# Patient Record
Sex: FEMALE | Race: WHITE | URBAN - METROPOLITAN AREA
[De-identification: names, ages, dates, MRNs, and addresses within clinical notes are randomized per-mention and may not be internally consistent; named-entity substitution may affect disease eponyms.]

---

## 2022-07-01 ENCOUNTER — TELEPHONE (OUTPATIENT)
Dept: INTERNAL MEDICINE CLINIC | Age: 37
End: 2022-07-01

## 2022-07-01 NOTE — TELEPHONE ENCOUNTER
----- Message from Renato Yang sent at 7/1/2022  1:18 PM EDT -----  Subject: Message to Provider    QUESTIONS  Information for Provider? Pt is requesting to schedule with Dr. Tsering Dawkins. She is new to the area and is not an established pt. I informed her she   may need to be established first. I was unable to get in touch with the   office for confirmation. Please contact pt for scheduling or further   discussion.   ---------------------------------------------------------------------------  --------------  [address]  [phone];  Do not leave any message, patient will call back for answer  ---------------------------------------------------------------------------  --------------  SCRIPT ANSWERS  undefined

## 2022-07-01 NOTE — TELEPHONE ENCOUNTER
Attempted to contact pt, per call back info below no VM was left. If pt returns call please let her know she will need to establish with a PCP within our office then they will refer her. As of right now Dr. Maykel Silva is booked until the end of October, it pt wants to be seen sooner she can contact her insurance company and they will let her know who is in network.

## 2023-11-06 ENCOUNTER — OFFICE VISIT (OUTPATIENT)
Age: 38
End: 2023-11-06

## 2023-11-06 VITALS
OXYGEN SATURATION: 95 % | SYSTOLIC BLOOD PRESSURE: 123 MMHG | HEIGHT: 63 IN | HEART RATE: 119 BPM | WEIGHT: 175.7 LBS | DIASTOLIC BLOOD PRESSURE: 85 MMHG | TEMPERATURE: 98 F | BODY MASS INDEX: 31.13 KG/M2

## 2023-11-06 DIAGNOSIS — J45.901 ASTHMA WITH ACUTE EXACERBATION, UNSPECIFIED ASTHMA SEVERITY, UNSPECIFIED WHETHER PERSISTENT: Primary | ICD-10-CM

## 2023-11-06 PROBLEM — F41.1 GENERALIZED ANXIETY DISORDER: Status: ACTIVE | Noted: 2022-09-07

## 2023-11-06 PROBLEM — G47.10 HYPERSOMNIA: Status: ACTIVE | Noted: 2022-07-18

## 2023-11-06 PROBLEM — M54.10 RADICULOPATHY AFFECTING UPPER EXTREMITY: Status: ACTIVE | Noted: 2022-09-07

## 2023-11-06 RX ORDER — PREDNISONE 10 MG/1
TABLET ORAL
Qty: 21 TABLET | Refills: 0 | Status: SHIPPED | OUTPATIENT
Start: 2023-11-06

## 2023-11-06 RX ORDER — ALBUTEROL SULFATE 90 UG/1
2 AEROSOL, METERED RESPIRATORY (INHALATION) EVERY 6 HOURS PRN
Qty: 18 G | Refills: 1 | Status: SHIPPED | OUTPATIENT
Start: 2023-11-06

## 2023-11-06 RX ORDER — DIAZEPAM 5 MG/1
5 TABLET ORAL 2 TIMES DAILY
COMMUNITY

## 2023-11-06 RX ORDER — BUPROPION HYDROCHLORIDE 150 MG/1
150 TABLET ORAL
COMMUNITY
Start: 2023-03-14

## 2023-11-06 RX ORDER — FLUVOXAMINE MALEATE 100 MG
150 TABLET ORAL DAILY
COMMUNITY

## 2023-11-06 RX ORDER — FLUTICASONE PROPIONATE 250 UG/1
1 POWDER, METERED RESPIRATORY (INHALATION) 2 TIMES DAILY
Qty: 60 EACH | Refills: 0 | Status: SHIPPED | OUTPATIENT
Start: 2023-11-06 | End: 2023-12-06

## 2023-11-06 RX ORDER — FLUVOXAMINE MALEATE 50 MG/1
100 TABLET, COATED ORAL NIGHTLY
COMMUNITY

## 2023-11-06 RX ORDER — PROPRANOLOL HYDROCHLORIDE 10 MG/1
10 TABLET ORAL PRN
COMMUNITY

## 2023-11-06 RX ORDER — TRAZODONE HYDROCHLORIDE 100 MG/1
100 TABLET ORAL NIGHTLY
COMMUNITY

## 2023-11-06 NOTE — PATIENT INSTRUCTIONS
- Steroids will cause your glucose (blood sugar) levels to rise. If you are diabetic, please monitor your glucose levels carefully. Watch your food intake and take caution with foods high in sugar and carbohydrates as weight gain is another side effect of steroid use. Elevated heart rate is another common finding with steroids. Take this medication with food as it can irritate your stomach to the same degree that ibuprofen would. Deidre,    It was an absolute pleasure taking care of you today. I'm hoping you'll start feeling better as soon as possible. Please call me if you have any questions or concerns about what we talked about today. Feel free stop back in if anything changes or things seem to be getting worse. If for some reason you develop any serious or potentially life-threatening issue, call 911 or proceed to the nearest emergency department. Hopefully it will never come to that. Otherwise, it was very nice to meet you Deidre.       Thanks,     Mimi aVlderrama

## 2024-08-19 ENCOUNTER — OFFICE VISIT (OUTPATIENT)
Age: 39
End: 2024-08-19

## 2024-08-19 VITALS
SYSTOLIC BLOOD PRESSURE: 110 MMHG | WEIGHT: 158 LBS | HEIGHT: 63 IN | OXYGEN SATURATION: 97 % | HEART RATE: 106 BPM | BODY MASS INDEX: 28 KG/M2 | TEMPERATURE: 98.2 F | DIASTOLIC BLOOD PRESSURE: 68 MMHG

## 2024-08-19 DIAGNOSIS — R21 RASH AND NONSPECIFIC SKIN ERUPTION: Primary | ICD-10-CM

## 2024-08-19 RX ORDER — NORETHINDRONE AND ETHINYL ESTRADIOL 1 MG-35MCG
KIT ORAL
COMMUNITY
Start: 2024-07-29

## 2024-08-19 RX ORDER — METHYLPREDNISOLONE 4 MG/1
TABLET ORAL
Qty: 1 KIT | Refills: 0 | Status: SHIPPED | OUTPATIENT
Start: 2024-08-19

## 2024-09-26 ENCOUNTER — OFFICE VISIT (OUTPATIENT)
Age: 39
End: 2024-09-26

## 2024-09-26 VITALS
BODY MASS INDEX: 29.54 KG/M2 | OXYGEN SATURATION: 97 % | HEIGHT: 63 IN | HEART RATE: 96 BPM | TEMPERATURE: 97.8 F | RESPIRATION RATE: 22 BRPM | DIASTOLIC BLOOD PRESSURE: 69 MMHG | WEIGHT: 166.7 LBS | SYSTOLIC BLOOD PRESSURE: 129 MMHG

## 2024-09-26 DIAGNOSIS — J45.901 ASTHMA EXACERBATION, MILD: Primary | ICD-10-CM

## 2024-09-26 DIAGNOSIS — H66.001 NON-RECURRENT ACUTE SUPPURATIVE OTITIS MEDIA OF RIGHT EAR WITHOUT SPONTANEOUS RUPTURE OF TYMPANIC MEMBRANE: ICD-10-CM

## 2024-09-26 DIAGNOSIS — R05.1 ACUTE COUGH: ICD-10-CM

## 2024-09-26 DIAGNOSIS — R11.11 VOMITING WITHOUT NAUSEA, UNSPECIFIED VOMITING TYPE: ICD-10-CM

## 2024-09-26 DIAGNOSIS — R06.2 WHEEZING: ICD-10-CM

## 2024-09-26 RX ORDER — METHYLPREDNISOLONE 4 MG
TABLET, DOSE PACK ORAL
Qty: 21 TABLET | Refills: 0 | Status: SHIPPED | OUTPATIENT
Start: 2024-09-26 | End: 2024-10-02

## 2024-09-26 RX ORDER — ONDANSETRON 4 MG/1
4 TABLET, ORALLY DISINTEGRATING ORAL ONCE
Status: COMPLETED | OUTPATIENT
Start: 2024-09-26 | End: 2024-09-26

## 2024-09-26 RX ORDER — LEVOTHYROXINE SODIUM 25 UG/1
25 TABLET ORAL DAILY
COMMUNITY
Start: 2024-04-08 | End: 2025-04-08

## 2024-09-26 RX ORDER — IPRATROPIUM BROMIDE AND ALBUTEROL SULFATE 2.5; .5 MG/3ML; MG/3ML
1 SOLUTION RESPIRATORY (INHALATION) ONCE
Status: COMPLETED | OUTPATIENT
Start: 2024-09-26 | End: 2024-09-26

## 2024-09-26 RX ORDER — AMOXICILLIN 500 MG/1
500 CAPSULE ORAL 2 TIMES DAILY
Qty: 14 CAPSULE | Refills: 0 | Status: SHIPPED | OUTPATIENT
Start: 2024-09-26 | End: 2024-10-03

## 2024-09-26 RX ADMIN — IPRATROPIUM BROMIDE AND ALBUTEROL SULFATE 1 DOSE: 2.5; .5 SOLUTION RESPIRATORY (INHALATION) at 11:34

## 2024-09-26 RX ADMIN — ONDANSETRON 4 MG: 4 TABLET, ORALLY DISINTEGRATING ORAL at 12:04

## 2024-09-26 ASSESSMENT — ENCOUNTER SYMPTOMS
SORE THROAT: 0
WHEEZING: 1
SHORTNESS OF BREATH: 1
SINUS PRESSURE: 1
COUGH: 1

## 2025-05-13 ENCOUNTER — OFFICE VISIT (OUTPATIENT)
Age: 40
End: 2025-05-13

## 2025-05-13 VITALS
TEMPERATURE: 98 F | RESPIRATION RATE: 24 BRPM | WEIGHT: 168.4 LBS | SYSTOLIC BLOOD PRESSURE: 111 MMHG | HEIGHT: 63 IN | DIASTOLIC BLOOD PRESSURE: 64 MMHG | OXYGEN SATURATION: 96 % | HEART RATE: 120 BPM | BODY MASS INDEX: 29.84 KG/M2

## 2025-05-13 DIAGNOSIS — J40 BRONCHITIS: ICD-10-CM

## 2025-05-13 DIAGNOSIS — J45.21 MILD INTERMITTENT ASTHMA WITH EXACERBATION: Primary | ICD-10-CM

## 2025-05-13 RX ORDER — BUSPIRONE HYDROCHLORIDE 10 MG/1
TABLET ORAL
COMMUNITY
Start: 2024-12-10

## 2025-05-13 RX ORDER — PREDNISONE 20 MG/1
20 TABLET ORAL 2 TIMES DAILY
Qty: 10 TABLET | Refills: 0 | Status: SHIPPED | OUTPATIENT
Start: 2025-05-13 | End: 2025-05-13

## 2025-05-13 RX ORDER — AZITHROMYCIN 250 MG/1
TABLET, FILM COATED ORAL
Qty: 6 TABLET | Refills: 0 | Status: SHIPPED | OUTPATIENT
Start: 2025-05-13 | End: 2025-05-23

## 2025-05-13 RX ORDER — PREDNISONE 20 MG/1
20 TABLET ORAL 2 TIMES DAILY
Qty: 10 TABLET | Refills: 0 | Status: SHIPPED | OUTPATIENT
Start: 2025-05-13 | End: 2025-05-18

## 2025-05-13 RX ORDER — AZITHROMYCIN 250 MG/1
TABLET, FILM COATED ORAL
Qty: 6 TABLET | Refills: 0 | Status: SHIPPED | OUTPATIENT
Start: 2025-05-13 | End: 2025-05-13

## 2025-05-13 RX ORDER — CHORIOGONADOTROPIN ALFA 250 UG/.5ML
INJECTION, SOLUTION SUBCUTANEOUS
COMMUNITY
Start: 2025-02-05

## 2025-05-13 RX ORDER — IPRATROPIUM BROMIDE AND ALBUTEROL SULFATE 2.5; .5 MG/3ML; MG/3ML
1 SOLUTION RESPIRATORY (INHALATION) ONCE
Status: COMPLETED | OUTPATIENT
Start: 2025-05-13 | End: 2025-05-13

## 2025-05-13 RX ADMIN — IPRATROPIUM BROMIDE AND ALBUTEROL SULFATE 1 DOSE: 2.5; .5 SOLUTION RESPIRATORY (INHALATION) at 18:53

## 2025-05-13 NOTE — PROGRESS NOTES
Deidre Liu (:  1985) is a 40 y.o. female,Established patient, here for evaluation of the following chief complaint(s):  Cough (C/o cough, wheezing, lightheaded,  and asthma flare up X 3 days )    Pt presents with persistent cough, congestion, and wheezing, consistent with acute bronchitis with reactive airway involvement.  Due to duration of symptoms, rapid onset, and concern for possible bacterial infection, prescribed Zithromax.  Prednisone initiated for airway inflammation, continue Albuterol (patient has at home) for bronchospasm, and Bromfed for symptomatic relief. Patient stable for outpatient management.      ASSESSMENT/PLAN:  1. Mild intermittent asthma with exacerbation    - ipratropium 0.5 mg-albuterol 2.5 mg (DUONEB) nebulizer solution 1 Dose    -Patient responded well to nebulizer treatment.  Her HR decreased, wheezing decreased, oxygen saturation increased to 96%    - predniSONE (DELTASONE) 20 MG tablet; Take 1 tablet by mouth 2 times daily for 5 days  Dispense: 10 tablet; Refill: 0    albuterol sulfate HFA (PROVENTIL;VENTOLIN;PROAIR) 108 (90 Base) MCG/ACT inhaler      Boogie Martinez APRN - CNP         Summary: Inhale 2 puffs into the lungs every 6 hours as needed for Wheezing, Disp-18 g, R-1 Normal        2. Bronchitis    - azithromycin (ZITHROMAX) 250 MG tablet; 500mg on day 1 followed by 250mg on days 2 - 5  Dispense: 6 tablet; Refill: 0       Return if symptoms worsen or fail to improve.    SUBJECTIVE/OBJECTIVE:  40-year-old female with history of asthma presents to clinic with complaints of wheezing, sob, cough, and lightheadedness worsening over the last 3 days.  She has been using her albuterol inhaler as needed but it is not effective today.      History provided by:  Patient      Vitals:    25 1829 25 1857 25 1914   BP: 111/64     BP Site: Left Upper Arm     Patient Position: Sitting     BP Cuff Size: Large Adult     Pulse: (!) 117 (!) 130 (!) 120   Resp:   24

## 2025-05-13 NOTE — PATIENT INSTRUCTIONS
Follow up with your primary care provider as discussed.    Take medication as prescribed.      Take a medicine like acetaminophen (sample brand name: Tylenol) or ibuprofen (sample brand names: Advil, Motrin) to help bring down your fever or for discomfort.    Go to the nearest emergency room for symptoms including, but not limited to, shortness of breath, chest pain, mental status change, fevers >101, difficulty or inability to swallow, dehydration, or if symptoms worsen.

## 2025-05-14 ASSESSMENT — ENCOUNTER SYMPTOMS
TROUBLE SWALLOWING: 0
FACIAL SWELLING: 0
NAUSEA: 0
EYES NEGATIVE: 1
EYE PAIN: 0
COUGH: 1
ABDOMINAL PAIN: 0
WHEEZING: 1
VOICE CHANGE: 0
VOMITING: 0
CHEST TIGHTNESS: 1
RHINORRHEA: 0
DIARRHEA: 0
SINUS PAIN: 0
SORE THROAT: 0
GASTROINTESTINAL NEGATIVE: 1
SHORTNESS OF BREATH: 1

## 2025-06-09 ENCOUNTER — OFFICE VISIT (OUTPATIENT)
Dept: PRIMARY CARE CLINIC | Age: 40
End: 2025-06-09
Payer: COMMERCIAL

## 2025-06-09 VITALS
BODY MASS INDEX: 29.23 KG/M2 | DIASTOLIC BLOOD PRESSURE: 90 MMHG | WEIGHT: 165 LBS | SYSTOLIC BLOOD PRESSURE: 130 MMHG | OXYGEN SATURATION: 99 % | HEART RATE: 88 BPM | HEIGHT: 63 IN

## 2025-06-09 DIAGNOSIS — G47.419 PRIMARY NARCOLEPSY WITHOUT CATAPLEXY: ICD-10-CM

## 2025-06-09 DIAGNOSIS — I99.9 VASCULAR ABNORMALITY: ICD-10-CM

## 2025-06-09 DIAGNOSIS — R00.0 SINUS TACHYCARDIA: ICD-10-CM

## 2025-06-09 DIAGNOSIS — J30.2 SEASONAL ALLERGIES: ICD-10-CM

## 2025-06-09 DIAGNOSIS — M25.511 CHRONIC RIGHT SHOULDER PAIN: ICD-10-CM

## 2025-06-09 DIAGNOSIS — G89.29 CHRONIC RIGHT SHOULDER PAIN: ICD-10-CM

## 2025-06-09 DIAGNOSIS — F33.42 RECURRENT MAJOR DEPRESSIVE DISORDER, IN FULL REMISSION: ICD-10-CM

## 2025-06-09 DIAGNOSIS — J45.50 SEVERE PERSISTENT ASTHMA WITHOUT COMPLICATION (HCC): Primary | ICD-10-CM

## 2025-06-09 DIAGNOSIS — R56.9 SEIZURES (HCC): ICD-10-CM

## 2025-06-09 DIAGNOSIS — F41.1 GENERALIZED ANXIETY DISORDER: ICD-10-CM

## 2025-06-09 DIAGNOSIS — E03.8 SUBCLINICAL HYPOTHYROIDISM: ICD-10-CM

## 2025-06-09 PROCEDURE — 99204 OFFICE O/P NEW MOD 45 MIN: CPT | Performed by: FAMILY MEDICINE

## 2025-06-09 RX ORDER — ALBUTEROL SULFATE 90 UG/1
2 INHALANT RESPIRATORY (INHALATION) EVERY 6 HOURS PRN
Qty: 18 G | Refills: 1 | Status: SHIPPED | OUTPATIENT
Start: 2025-06-09

## 2025-06-09 RX ORDER — FLUTICASONE PROPIONATE AND SALMETEROL XINAFOATE 230; 21 UG/1; UG/1
2 AEROSOL, METERED RESPIRATORY (INHALATION) 2 TIMES DAILY
Qty: 12 G | Refills: 3 | Status: SHIPPED | OUTPATIENT
Start: 2025-06-09

## 2025-06-09 RX ORDER — PREDNISONE 20 MG/1
20 TABLET ORAL DAILY
COMMUNITY

## 2025-06-09 SDOH — ECONOMIC STABILITY: FOOD INSECURITY: WITHIN THE PAST 12 MONTHS, YOU WORRIED THAT YOUR FOOD WOULD RUN OUT BEFORE YOU GOT MONEY TO BUY MORE.: NEVER TRUE

## 2025-06-09 SDOH — HEALTH STABILITY: PHYSICAL HEALTH: ON AVERAGE, HOW MANY DAYS PER WEEK DO YOU ENGAGE IN MODERATE TO STRENUOUS EXERCISE (LIKE A BRISK WALK)?: 2 DAYS

## 2025-06-09 SDOH — HEALTH STABILITY: PHYSICAL HEALTH: ON AVERAGE, HOW MANY MINUTES DO YOU ENGAGE IN EXERCISE AT THIS LEVEL?: 30 MIN

## 2025-06-09 SDOH — ECONOMIC STABILITY: FOOD INSECURITY: WITHIN THE PAST 12 MONTHS, THE FOOD YOU BOUGHT JUST DIDN'T LAST AND YOU DIDN'T HAVE MONEY TO GET MORE.: NEVER TRUE

## 2025-06-09 ASSESSMENT — PATIENT HEALTH QUESTIONNAIRE - PHQ9
1. LITTLE INTEREST OR PLEASURE IN DOING THINGS: NOT AT ALL
2. FEELING DOWN, DEPRESSED OR HOPELESS: NOT AT ALL
SUM OF ALL RESPONSES TO PHQ QUESTIONS 1-9: 0

## 2025-06-09 ASSESSMENT — ENCOUNTER SYMPTOMS
EYE PAIN: 0
COUGH: 0
SORE THROAT: 0
SHORTNESS OF BREATH: 0
ABDOMINAL PAIN: 0

## 2025-06-09 NOTE — PROGRESS NOTES
pulmonology for further evaluation and management of severe symptoms  - [facility] - Rob Garcia MD, Allergy & Immunology, [facility]  - Alverto Reis MD, Pulmonary, [facility]    2. Seasonal allergies  - [facility] - Rob Garcia MD, Allergy & Immunology, [facility]    3. Seizures (HCC)  - witnessed seizure in [place], had normal CT head then but no other work-up  - check MRI brain  - referred to neurology  - MRI BRAIN WO CONTRAST; Future  - Martin Spence MD, Neurology, [facility]    4. Sinus tachycardia  - previously with normal ECHO  - referred to cardiology  - Trish Jiang DO, Cardiology, [facility]    5. Vascular abnormality of right external jugular  - Audie Bell DO, Vascular/Endovascular Surgery, [facility]    6. Subclinical hypothyroidism  - controlled on Synthroid 25 mcg    7. Chronic right shoulder pain  - previously was seeing ortho and PT, requesting referral to continue with PT  - [facility] - [facility]    8. Recurrent major depressive disorder, in full remission  - controlled, following with psychiatry  - cont SSRI    9. Generalized anxiety disorder  - stable, following with psychiatry    10. Primary narcolepsy without cataplexy  - stable off medications at this time       No follow-ups on file.    Electronically signed by Albertina Reid MD on 6/9/2025 at 10:56 AM

## 2025-06-11 ENCOUNTER — RESULTS FOLLOW-UP (OUTPATIENT)
Dept: NEUROLOGY | Age: 40
End: 2025-06-11

## 2025-06-12 ENCOUNTER — OFFICE VISIT (OUTPATIENT)
Dept: NEUROLOGY | Age: 40
End: 2025-06-12
Payer: COMMERCIAL

## 2025-06-12 VITALS
DIASTOLIC BLOOD PRESSURE: 102 MMHG | BODY MASS INDEX: 28.99 KG/M2 | WEIGHT: 163.6 LBS | HEIGHT: 63 IN | HEART RATE: 115 BPM | SYSTOLIC BLOOD PRESSURE: 135 MMHG

## 2025-06-12 DIAGNOSIS — R56.9 NEW ONSET SEIZURE (HCC): Primary | ICD-10-CM

## 2025-06-12 DIAGNOSIS — G47.419 PRIMARY NARCOLEPSY WITHOUT CATAPLEXY: ICD-10-CM

## 2025-06-12 DIAGNOSIS — F41.1 GENERALIZED ANXIETY DISORDER: ICD-10-CM

## 2025-06-12 PROCEDURE — 99204 OFFICE O/P NEW MOD 45 MIN: CPT | Performed by: PSYCHIATRY & NEUROLOGY

## 2025-06-12 NOTE — PROGRESS NOTES
treatment options as well as adherence to medication regimen and side effect from these medications.        Assessment:     Diagnosis Orders   1. New onset seizure (HCC)  EEG      2. Primary narcolepsy without cataplexy        3. Generalized anxiety disorder            New onset seizure. So far no risk for epilepsy, no specific cause  Narcolepsy  SELIN  Bronchial asthma    Plan:  Schedule 1 hour EEG  No need to start AED unless seizures recur  No driving for 3 months  Seizure precaution, risk of falling, risk of recurrence and safety discussed in detail with the patient  Continue respiratory support  Continue levofloxacin  Follow-up with me if symptoms recur or if EEG is abnormal.

## 2025-06-13 ENCOUNTER — TELEPHONE (OUTPATIENT)
Dept: VASCULAR SURGERY | Age: 40
End: 2025-06-13

## 2025-06-13 NOTE — TELEPHONE ENCOUNTER
I left a message on the patient voicemail, to offer patient an appointment with Dr. Sinclair for an eval. We received a referral from Dr. Reid for a treat and eval for patient.    Thanks,  Estrada

## 2025-06-19 ENCOUNTER — PROCEDURE VISIT (OUTPATIENT)
Dept: NEUROLOGY | Age: 40
End: 2025-06-19
Payer: COMMERCIAL

## 2025-06-19 DIAGNOSIS — R56.9 NEW ONSET SEIZURE (HCC): ICD-10-CM

## 2025-06-19 PROCEDURE — 95812 EEG 41-60 MINUTES: CPT | Performed by: PSYCHIATRY & NEUROLOGY

## 2025-06-19 NOTE — PATIENT INSTRUCTIONS
Verbal consent was obtained from patient and/or patient's advocate for in office procedure with Dr. Martin Land MD (EMG or EEG).

## 2025-06-24 ENCOUNTER — OFFICE VISIT (OUTPATIENT)
Dept: PULMONOLOGY | Age: 40
End: 2025-06-24
Payer: COMMERCIAL

## 2025-06-24 VITALS
SYSTOLIC BLOOD PRESSURE: 132 MMHG | DIASTOLIC BLOOD PRESSURE: 78 MMHG | HEART RATE: 102 BPM | OXYGEN SATURATION: 100 % | WEIGHT: 164.6 LBS | BODY MASS INDEX: 29.16 KG/M2 | HEIGHT: 63 IN

## 2025-06-24 DIAGNOSIS — J45.50 SEVERE PERSISTENT ASTHMA WITHOUT COMPLICATION (HCC): Primary | ICD-10-CM

## 2025-06-24 PROCEDURE — 99204 OFFICE O/P NEW MOD 45 MIN: CPT | Performed by: INTERNAL MEDICINE

## 2025-06-24 RX ORDER — MONTELUKAST SODIUM 10 MG/1
10 TABLET ORAL NIGHTLY
Qty: 30 TABLET | Refills: 3 | Status: SHIPPED | OUTPATIENT
Start: 2025-06-24

## 2025-06-24 RX ORDER — BUDESONIDE, GLYCOPYRROLATE, AND FORMOTEROL FUMARATE 160; 9; 4.8 UG/1; UG/1; UG/1
2 AEROSOL, METERED RESPIRATORY (INHALATION) 2 TIMES DAILY
Qty: 1 EACH | Refills: 5 | Status: SHIPPED | OUTPATIENT
Start: 2025-06-24

## 2025-06-24 RX ORDER — PREDNISONE 10 MG/1
10 TABLET ORAL DAILY
Qty: 30 TABLET | Refills: 11 | Status: SHIPPED | OUTPATIENT
Start: 2025-06-24 | End: 2026-06-19

## 2025-06-24 ASSESSMENT — ENCOUNTER SYMPTOMS
CONSTIPATION: 0
NAUSEA: 0
ABDOMINAL PAIN: 0
DIARRHEA: 0
SINUS PRESSURE: 0

## 2025-06-24 NOTE — PROGRESS NOTES
tenderness.   Abdominal:      General: Bowel sounds are normal. There is no distension.      Palpations: Abdomen is soft. Mass: There is no hepatosplenomegaly.      Tenderness: There is no abdominal tenderness.   Musculoskeletal:         General: No tenderness (Muscle strength is normal and there is no obvious atrophy).      Cervical back: Neck supple.   Lymphadenopathy:      Cervical: No cervical adenopathy.   Skin:     General: Skin is warm and dry.      Findings: No rash.   Psychiatric:      Comments: Oriented to person place and time         No Known Allergies  Prior to Visit Medications    Medication Sig Taking? Authorizing Provider   Budeson-Glycopyrrol-Formoterol (BREZTRI AEROSPHERE) 160-9-4.8 MCG/ACT AERO Inhale 2 puffs into the lungs 2 times daily Yes Alfred Elizabeth MD   montelukast (SINGULAIR) 10 MG tablet Take 1 tablet by mouth nightly Yes Alfred Elizabeth MD   predniSONE (DELTASONE) 10 MG tablet Take 1 tablet by mouth daily Yes Alfred Elizabeth MD   predniSONE (DELTASONE) 20 MG tablet Take 1 tablet by mouth daily  Patient taking differently: Take 0.5 tablets by mouth daily Yes Michael Grissom MD   fluticasone-salmeterol (ADVAIR HFA) 230-21 MCG/ACT inhaler Inhale 2 puffs into the lungs 2 times daily Yes Albertina Reid MD   albuterol sulfate HFA (PROVENTIL;VENTOLIN;PROAIR) 108 (90 Base) MCG/ACT inhaler Inhale 2 puffs into the lungs every 6 hours as needed for Wheezing Yes Albertina Reid MD   busPIRone (BUSPAR) 10 MG tablet  Yes Michael Grissom MD   diazePAM (VALIUM) 5 MG tablet Take 1 tablet by mouth 2 times daily. Yes Michael Grissom MD   fluvoxaMINE (LUVOX) 100 MG tablet Take 1.5 tablets by mouth daily Yes Michael Grissom MD   traZODone (DESYREL) 100 MG tablet Take 1 tablet by mouth nightly Yes Michael Grissom MD   levothyroxine (SYNTHROID) 25 MCG tablet Take 1 tablet by mouth daily  Michael Grissom MD       Vitals:    06/24/25 1606   BP: 132/78   BP Site:

## 2025-06-25 ENCOUNTER — HOSPITAL ENCOUNTER (OUTPATIENT)
Dept: PHYSICAL THERAPY | Age: 40
Setting detail: THERAPIES SERIES
Discharge: HOME | End: 2025-06-25
Payer: COMMERCIAL

## 2025-06-25 ENCOUNTER — OFFICE VISIT (OUTPATIENT)
Dept: VASCULAR SURGERY | Age: 40
End: 2025-06-25
Payer: COMMERCIAL

## 2025-06-25 VITALS
OXYGEN SATURATION: 9 % | HEART RATE: 133 BPM | DIASTOLIC BLOOD PRESSURE: 90 MMHG | WEIGHT: 165 LBS | SYSTOLIC BLOOD PRESSURE: 140 MMHG | BODY MASS INDEX: 29.23 KG/M2 | HEIGHT: 63 IN

## 2025-06-25 DIAGNOSIS — G89.29 CHRONIC RIGHT SHOULDER PAIN: Primary | ICD-10-CM

## 2025-06-25 DIAGNOSIS — I86.8 JUGULAR VEIN ANEURYSM: Primary | ICD-10-CM

## 2025-06-25 DIAGNOSIS — M25.611 DECREASED RANGE OF MOTION OF RIGHT SHOULDER: ICD-10-CM

## 2025-06-25 DIAGNOSIS — R29.898 DECREASED RANGE OF MOTION OF NECK: ICD-10-CM

## 2025-06-25 DIAGNOSIS — M25.511 CHRONIC RIGHT SHOULDER PAIN: Primary | ICD-10-CM

## 2025-06-25 DIAGNOSIS — M54.2 NECK PAIN ON RIGHT SIDE: ICD-10-CM

## 2025-06-25 PROCEDURE — 97140 MANUAL THERAPY 1/> REGIONS: CPT

## 2025-06-25 PROCEDURE — 97161 PT EVAL LOW COMPLEX 20 MIN: CPT

## 2025-06-25 PROCEDURE — 99203 OFFICE O/P NEW LOW 30 MIN: CPT | Performed by: SURGERY

## 2025-06-25 PROCEDURE — 97110 THERAPEUTIC EXERCISES: CPT

## 2025-06-25 RX ORDER — CETIRIZINE HYDROCHLORIDE 10 MG/1
10 TABLET ORAL
COMMUNITY

## 2025-06-25 NOTE — PLAN OF CARE
[facility]: [address], [place] [address] office: [phone] fax: [phone]     Physical Therapy Initial Evaluation Certification      Dear Albertina Reid MD ,    We had the pleasure of evaluating the following patient for physical therapy services at [facility].  A summary of our findings can be found in the initial assessment below.  This includes our plan of care.  If you have any questions or concerns regarding these findings, please do not hesitate to contact me at the office phone number listed above.  Thank you for the referral.     Physician Signature:_______________________________Date:__________________  By signing above (or electronic signature), therapist’s plan is approved by physician       Physical Therapy: TREATMENT/PROGRESS NOTE   Patient: Deidre Liu (40 y.o. female)   Examination Date: 2025   :  1985 MRN: [id number]   Visit #: 1   Insurance Allowable Auth Needed   60 pcy [x]Yes -carelon    []No    Insurance: Pay[place] / Plan: [place] / Product Type: *No Product type* /   Insurance ID: [id number] - ([facility])  Secondary Insurance (if applicable):    Treatment Diagnosis:     ICD-10-CM    1. Chronic right shoulder pain  M25.511     G89.29       2. Neck pain on right side  M54.2       3. Decreased range of motion of neck  R29.898       4. Decreased range of motion of right shoulder  M25.611          Medical Diagnosis:  Pain in right shoulder [M25.511]  Other chronic pain [G89.29]   Referring Physician: Albertina Reid MD  PCP: Albertina Reid MD     Plan of care signed (Y/N):     Date of Patient follow up with Physician:      Plan of Care Report: EVAL today  POC update due: (10 visits /OR AUTH LIMITS, whichever is less)  2025                                             Medical History:  Comorbidities:  Anxiety  Depression  Relevant Medical History: asthma,

## 2025-06-25 NOTE — PROGRESS NOTES
[facility]  Consultation Note    Chief Complaint / Reason for Consultation  Venous bulge in the right neck    History of Present Illness  Patient is a 40 y.o. female who presents for evaluation of a venous anomaly of her right neck which is reported to be a possible venous aneurysm of unclear source.  Patient states this is progressively worsened over the course of the last year.  Is not painful in nature, however she does note that over the course of the last year she has noted progressive venous engorgement throughout her right chest and neck regions.  The area of concern is her predominant issue and is isolated anterior to her sternocleidomastoid on the right neck.  She denies trauma and denies any past instrumentation or IV access.  She reports that she has underwent duplex which identified it to be venous in origin.  She states that she has underwent multiple imaging studies and has been excluded from thoracic outlet syndrome diagnoses.  Of note, the patient is undergoing IVF with the next cycle to start in approximately 1 weeks duration.    Review of Systems     Denies fevers, chills, chest pain, shortness of breath, nausea, vomiting, hematemesis, diarrhea, constipation, melena, hematochezia, wt changes, vision problems, blindness, hearing problems, facial droop, slurred speech, extremity weakness, extremity numbness, dysuria.    Past Medical History:   has a past medical history of ADHD (attention deficit hyperactivity disorder), Allergic rhinitis, Anxiety, Asthma, Depression, Headache, Hypertension, and Hypothyroidism.     Past Surgical History:   has a past surgical history that includes Spine surgery.     Medications:  Current Outpatient Medications on File Prior to Visit   Medication Sig Dispense Refill    cetirizine (ZYRTEC) 10 MG tablet Take 1 tablet by mouth      Budeson-Glycopyrrol-Formoterol (BREZTRI AEROSPHERE) 160-9-4.8 MCG/ACT AERO Inhale 2 puffs into the lungs 2 times

## 2025-06-27 NOTE — PROGRESS NOTES
Electroencephalogram report          Patient: Deidre Liu    MR Number: [id number]  YOB: 1985  Date of Visit: 6/19/2025    Clinical History:  The patient is a 40 y.o. years old female with new onset seizure.      Method:  This is one hour digitalized EEG recording. The EEG was performed using the international 10/20 of electrode placements using both referential and bipolar montages. The patient was awake, and drowsy during recording.  Photic stimulation and hyperventilation were performed. Medications reviewed.     Findings:  The background of the EEG showed normal alpha posterior background of 9-10  HZ and amplitude of 20-40 UV. This background was symmetric, waxing and waning, and reactive with eye opening and closure. As the patient became drowsy generalized diffuse slowing was seen through recording at 6-7 HZ.  This generalized slowing was symmetric, non rhythmical, and continuous. No spike or sharp waves were seen. Photic stimulation produced normal posterior driving at higher frequency and hyperventilation did not activate the EEG.    Impression:  This one hour  EEG is normal. No epileptiform discharges, focal and lateralized abnormalities were noted.     Martin Vicente MD      Board certified in clinical neurophysiology  [facility]  [address] [place] [address]  Phone: [phone]  Fax: [phone]

## 2025-07-09 ENCOUNTER — HOSPITAL ENCOUNTER (OUTPATIENT)
Dept: PHYSICAL THERAPY | Age: 40
Setting detail: THERAPIES SERIES
Discharge: HOME | End: 2025-07-09
Payer: COMMERCIAL

## 2025-07-09 PROCEDURE — 20561 NDL INSJ W/O NJX 3+ MUSC: CPT

## 2025-07-09 PROCEDURE — 97110 THERAPEUTIC EXERCISES: CPT

## 2025-07-09 PROCEDURE — 97140 MANUAL THERAPY 1/> REGIONS: CPT

## 2025-07-09 NOTE — FLOWSHEET NOTE
weeks  Disability index score of 25% or less for the Neck Disability Index to assist with reaching prior level of function with activities such as reading, lifting, household chores, driving, and sleeping.  [] Progressing: [] Met: [] Not Met: [] Adjusted  Patient will demonstrate increased AROM of R cervical rotation to 70 without pain to allow for proper joint functioning to enable patient to drive and for school work.   [] Progressing: [] Met: [] Not Met: [] Adjusted  Patient will demonstrate increased Strength of R shoulder to 5/5 without pain to allow for proper functional mobility to enable patient to return to lifting, carrying, and driving activities.   [] Progressing: [] Met: [] Not Met: [] Adjusted  Patient will return to driving without increased symptoms or restriction of the R neck and shoulder.   [] Progressing: [] Met: [] Not Met: [] Adjusted      Overall Progression Towards Functional goals/ Treatment Progress Update:  [] Patient is progressing as expected towards functional goals listed.    [] Progression is slowed due to complexities/Impairments listed.  [] Progression has been slowed due to co-morbidities.  [x] Plan just implemented, too soon (<30days) to assess goals progression   [] Goals require adjustment due to lack of progress  [] Patient is not progressing as expected and requires additional follow up with physician  [] Other:     TREATMENT PLAN     Frequency/Duration: 1-2x/week for 4-6 weeks for the following treatment interventions:    Interventions:  Therapeutic Exercise (36060) including: strength training, ROM, and functional mobility  Therapeutic Activities (55350) including: functional mobility training and education.  Neuromuscular Re-education (72400) activation and proprioception, including postural re-education.    Manual Therapy (92480) as indicated to include: Passive Range of Motion, Gr I-IV mobilizations, Soft Tissue Mobilization, and Dry Needling/IASTM  Modalities as needed

## 2025-07-11 ENCOUNTER — APPOINTMENT (OUTPATIENT)
Dept: PHYSICAL THERAPY | Age: 40
End: 2025-07-11
Payer: COMMERCIAL

## 2025-07-16 ENCOUNTER — APPOINTMENT (OUTPATIENT)
Dept: PHYSICAL THERAPY | Age: 40
End: 2025-07-16
Payer: COMMERCIAL

## 2025-07-18 ENCOUNTER — HOSPITAL ENCOUNTER (OUTPATIENT)
Dept: PHYSICAL THERAPY | Age: 40
Setting detail: THERAPIES SERIES
End: 2025-07-18
Payer: COMMERCIAL

## 2025-07-21 ENCOUNTER — APPOINTMENT (OUTPATIENT)
Dept: PHYSICAL THERAPY | Age: 40
End: 2025-07-21
Payer: COMMERCIAL

## 2025-07-23 ENCOUNTER — HOSPITAL ENCOUNTER (OUTPATIENT)
Dept: PHYSICAL THERAPY | Age: 40
Setting detail: THERAPIES SERIES
End: 2025-07-23
Payer: COMMERCIAL

## 2025-08-04 ENCOUNTER — TELEPHONE (OUTPATIENT)
Dept: CARDIOLOGY CLINIC | Age: 40
End: 2025-08-04

## 2025-08-05 ENCOUNTER — OFFICE VISIT (OUTPATIENT)
Dept: CARDIOLOGY CLINIC | Age: 40
End: 2025-08-05
Payer: COMMERCIAL

## 2025-08-05 ENCOUNTER — ANCILLARY PROCEDURE (OUTPATIENT)
Dept: CARDIOLOGY CLINIC | Age: 40
End: 2025-08-05

## 2025-08-05 ENCOUNTER — OFFICE VISIT (OUTPATIENT)
Dept: PULMONOLOGY | Age: 40
End: 2025-08-05
Payer: COMMERCIAL

## 2025-08-05 VITALS
BODY MASS INDEX: 29.09 KG/M2 | WEIGHT: 164.2 LBS | SYSTOLIC BLOOD PRESSURE: 124 MMHG | HEIGHT: 63 IN | HEART RATE: 97 BPM | OXYGEN SATURATION: 98 % | DIASTOLIC BLOOD PRESSURE: 80 MMHG

## 2025-08-05 VITALS
SYSTOLIC BLOOD PRESSURE: 120 MMHG | DIASTOLIC BLOOD PRESSURE: 80 MMHG | WEIGHT: 164 LBS | HEIGHT: 63 IN | BODY MASS INDEX: 29.06 KG/M2 | HEART RATE: 78 BPM

## 2025-08-05 DIAGNOSIS — F41.9 ANXIETY: ICD-10-CM

## 2025-08-05 DIAGNOSIS — R00.0 SINUS TACHYCARDIA: ICD-10-CM

## 2025-08-05 DIAGNOSIS — G47.419 PRIMARY NARCOLEPSY WITHOUT CATAPLEXY: ICD-10-CM

## 2025-08-05 DIAGNOSIS — R00.0 SINUS TACHYCARDIA: Primary | ICD-10-CM

## 2025-08-05 DIAGNOSIS — G47.33 OSA (OBSTRUCTIVE SLEEP APNEA): ICD-10-CM

## 2025-08-05 DIAGNOSIS — J45.50 SEVERE PERSISTENT ASTHMA WITHOUT COMPLICATION (HCC): Primary | ICD-10-CM

## 2025-08-05 LAB — ECHO BSA: 1.82 M2

## 2025-08-05 PROCEDURE — G2211 COMPLEX E/M VISIT ADD ON: HCPCS | Performed by: INTERNAL MEDICINE

## 2025-08-05 PROCEDURE — 93000 ELECTROCARDIOGRAM COMPLETE: CPT | Performed by: INTERNAL MEDICINE

## 2025-08-05 PROCEDURE — 99214 OFFICE O/P EST MOD 30 MIN: CPT | Performed by: INTERNAL MEDICINE

## 2025-08-05 PROCEDURE — 99204 OFFICE O/P NEW MOD 45 MIN: CPT | Performed by: INTERNAL MEDICINE

## 2025-08-05 RX ORDER — PROPRANOLOL HYDROCHLORIDE 10 MG/1
10 TABLET ORAL PRN
COMMUNITY
Start: 2025-08-05

## 2025-08-15 ENCOUNTER — TELEMEDICINE ON DEMAND (OUTPATIENT)
Age: 40
End: 2025-08-15
Payer: COMMERCIAL

## 2025-08-15 DIAGNOSIS — A69.20 ERYTHEMA MIGRANS (LYME DISEASE): Primary | ICD-10-CM

## 2025-08-15 PROCEDURE — 99213 OFFICE O/P EST LOW 20 MIN: CPT | Performed by: NURSE PRACTITIONER

## 2025-08-15 RX ORDER — DOXYCYCLINE HYCLATE 100 MG
100 TABLET ORAL 2 TIMES DAILY
Qty: 20 TABLET | Refills: 0 | Status: SHIPPED | OUTPATIENT
Start: 2025-08-15 | End: 2025-08-25

## 2025-09-03 ENCOUNTER — TELEPHONE (OUTPATIENT)
Dept: CARDIOLOGY CLINIC | Age: 40
End: 2025-09-03